# Patient Record
Sex: FEMALE | Race: WHITE | NOT HISPANIC OR LATINO | Employment: OTHER | URBAN - METROPOLITAN AREA
[De-identification: names, ages, dates, MRNs, and addresses within clinical notes are randomized per-mention and may not be internally consistent; named-entity substitution may affect disease eponyms.]

---

## 2017-11-25 ENCOUNTER — HOSPITAL ENCOUNTER (INPATIENT)
Facility: HOSPITAL | Age: 62
LOS: 1 days | Discharge: HOME | DRG: 436 | End: 2017-11-27
Attending: EMERGENCY MEDICINE | Admitting: INTERNAL MEDICINE
Payer: OTHER GOVERNMENT

## 2017-11-25 DIAGNOSIS — K86.3 PANCREATIC PSEUDOCYST/CYST: Chronic | ICD-10-CM

## 2017-11-25 DIAGNOSIS — R52 INTRACTABLE PAIN: ICD-10-CM

## 2017-11-25 DIAGNOSIS — K86.2 PANCREATIC PSEUDOCYST/CYST: Chronic | ICD-10-CM

## 2017-11-25 DIAGNOSIS — C78.7 METASTATIC CANCER TO LIVER: ICD-10-CM

## 2017-11-25 DIAGNOSIS — K86.1 OTHER CHRONIC PANCREATITIS: Primary | ICD-10-CM

## 2017-11-25 DIAGNOSIS — F32.A DEPRESSION, UNSPECIFIED DEPRESSION TYPE: ICD-10-CM

## 2017-11-25 DIAGNOSIS — C25.0 MALIGNANT NEOPLASM OF HEAD OF PANCREAS: ICD-10-CM

## 2017-11-25 DIAGNOSIS — C22.0 HCC (HEPATOCELLULAR CARCINOMA): Chronic | ICD-10-CM

## 2017-11-25 LAB
ALBUMIN SERPL BCP-MCNC: 4.1 G/DL
ALP SERPL-CCNC: 113 U/L
ALT SERPL W/O P-5'-P-CCNC: 27 U/L
AMMONIA PLAS-SCNC: 32 UMOL/L
AMORPH CRY URNS QL MICRO: ABNORMAL
AMPHET+METHAMPHET UR QL: NEGATIVE
ANION GAP SERPL CALC-SCNC: 14 MMOL/L
AST SERPL-CCNC: 25 U/L
BACTERIA #/AREA URNS HPF: ABNORMAL /HPF
BARBITURATES UR QL SCN>200 NG/ML: NEGATIVE
BASOPHILS # BLD AUTO: 0.02 K/UL
BASOPHILS NFR BLD: 0.2 %
BENZODIAZ UR QL SCN>200 NG/ML: NEGATIVE
BILIRUB SERPL-MCNC: 0.4 MG/DL
BILIRUB UR QL STRIP: NEGATIVE
BUN SERPL-MCNC: 14 MG/DL
BZE UR QL SCN: NEGATIVE
CALCIUM SERPL-MCNC: 9.1 MG/DL
CANNABINOIDS UR QL SCN: NEGATIVE
CHLORIDE SERPL-SCNC: 99 MMOL/L
CLARITY UR: ABNORMAL
CO2 SERPL-SCNC: 24 MMOL/L
COLOR UR: YELLOW
CREAT SERPL-MCNC: 0.6 MG/DL
CREAT UR-MCNC: 23.6 MG/DL
DIFFERENTIAL METHOD: ABNORMAL
EOSINOPHIL # BLD AUTO: 0.1 K/UL
EOSINOPHIL NFR BLD: 1.1 %
ERYTHROCYTE [DISTWIDTH] IN BLOOD BY AUTOMATED COUNT: 13.1 %
EST. GFR  (AFRICAN AMERICAN): >60 ML/MIN/1.73 M^2
EST. GFR  (NON AFRICAN AMERICAN): >60 ML/MIN/1.73 M^2
ETHANOL SERPL-MCNC: <10 MG/DL
GLUCOSE SERPL-MCNC: 73 MG/DL
GLUCOSE UR QL STRIP: NEGATIVE
HCT VFR BLD AUTO: 45.7 %
HGB BLD-MCNC: 16.3 G/DL
HGB UR QL STRIP: ABNORMAL
KETONES UR QL STRIP: ABNORMAL
LEUKOCYTE ESTERASE UR QL STRIP: NEGATIVE
LIPASE SERPL-CCNC: 57 U/L
LYMPHOCYTES # BLD AUTO: 1.8 K/UL
LYMPHOCYTES NFR BLD: 20.5 %
MCH RBC QN AUTO: 30.4 PG
MCHC RBC AUTO-ENTMCNC: 35.7 G/DL
MCV RBC AUTO: 85 FL
METHADONE UR QL SCN>300 NG/ML: NEGATIVE
MICROSCOPIC COMMENT: ABNORMAL
MONOCYTES # BLD AUTO: 0.5 K/UL
MONOCYTES NFR BLD: 5.2 %
NEUTROPHILS # BLD AUTO: 6.5 K/UL
NEUTROPHILS NFR BLD: 72.9 %
NITRITE UR QL STRIP: NEGATIVE
OPIATES UR QL SCN: NEGATIVE
PCP UR QL SCN>25 NG/ML: NEGATIVE
PH UR STRIP: 7 [PH] (ref 5–8)
PLATELET # BLD AUTO: 260 K/UL
PMV BLD AUTO: 9.9 FL
POTASSIUM SERPL-SCNC: 2.7 MMOL/L
PROT SERPL-MCNC: 7.3 G/DL
PROT UR QL STRIP: NEGATIVE
RBC # BLD AUTO: 5.36 M/UL
RBC #/AREA URNS HPF: 40 /HPF (ref 0–4)
SODIUM SERPL-SCNC: 137 MMOL/L
SP GR UR STRIP: 1.03 (ref 1–1.03)
SQUAMOUS #/AREA URNS HPF: 1 /HPF
TOXICOLOGY INFORMATION: NORMAL
TROPONIN I SERPL DL<=0.01 NG/ML-MCNC: 0.01 NG/ML
URN SPEC COLLECT METH UR: ABNORMAL
UROBILINOGEN UR STRIP-ACNC: NEGATIVE EU/DL
WBC # BLD AUTO: 8.87 K/UL
WBC #/AREA URNS HPF: 1 /HPF (ref 0–5)

## 2017-11-25 PROCEDURE — 81000 URINALYSIS NONAUTO W/SCOPE: CPT

## 2017-11-25 PROCEDURE — 83690 ASSAY OF LIPASE: CPT

## 2017-11-25 PROCEDURE — 93005 ELECTROCARDIOGRAM TRACING: CPT

## 2017-11-25 PROCEDURE — 82140 ASSAY OF AMMONIA: CPT

## 2017-11-25 PROCEDURE — 84484 ASSAY OF TROPONIN QUANT: CPT

## 2017-11-25 PROCEDURE — 85025 COMPLETE CBC W/AUTO DIFF WBC: CPT

## 2017-11-25 PROCEDURE — 63600175 PHARM REV CODE 636 W HCPCS: Performed by: EMERGENCY MEDICINE

## 2017-11-25 PROCEDURE — C9113 INJ PANTOPRAZOLE SODIUM, VIA: HCPCS | Performed by: EMERGENCY MEDICINE

## 2017-11-25 PROCEDURE — 25500020 PHARM REV CODE 255: Performed by: EMERGENCY MEDICINE

## 2017-11-25 PROCEDURE — 80307 DRUG TEST PRSMV CHEM ANLYZR: CPT

## 2017-11-25 PROCEDURE — 96365 THER/PROPH/DIAG IV INF INIT: CPT

## 2017-11-25 PROCEDURE — 80320 DRUG SCREEN QUANTALCOHOLS: CPT

## 2017-11-25 PROCEDURE — 99285 EMERGENCY DEPT VISIT HI MDM: CPT | Mod: 25

## 2017-11-25 PROCEDURE — 25000003 PHARM REV CODE 250: Performed by: EMERGENCY MEDICINE

## 2017-11-25 PROCEDURE — 96375 TX/PRO/DX INJ NEW DRUG ADDON: CPT

## 2017-11-25 PROCEDURE — 93010 ELECTROCARDIOGRAM REPORT: CPT | Mod: ,,, | Performed by: INTERNAL MEDICINE

## 2017-11-25 PROCEDURE — 96366 THER/PROPH/DIAG IV INF ADDON: CPT

## 2017-11-25 PROCEDURE — 80053 COMPREHEN METABOLIC PANEL: CPT

## 2017-11-25 PROCEDURE — 96361 HYDRATE IV INFUSION ADD-ON: CPT

## 2017-11-25 PROCEDURE — 96376 TX/PRO/DX INJ SAME DRUG ADON: CPT

## 2017-11-25 RX ORDER — POTASSIUM CHLORIDE 20 MEQ/1
40 TABLET, EXTENDED RELEASE ORAL
Status: COMPLETED | OUTPATIENT
Start: 2017-11-25 | End: 2017-11-25

## 2017-11-25 RX ORDER — HYDROMORPHONE HYDROCHLORIDE 2 MG/ML
1 INJECTION, SOLUTION INTRAMUSCULAR; INTRAVENOUS; SUBCUTANEOUS
Status: COMPLETED | OUTPATIENT
Start: 2017-11-25 | End: 2017-11-25

## 2017-11-25 RX ORDER — PANTOPRAZOLE SODIUM 40 MG/10ML
40 INJECTION, POWDER, LYOPHILIZED, FOR SOLUTION INTRAVENOUS
Status: COMPLETED | OUTPATIENT
Start: 2017-11-25 | End: 2017-11-25

## 2017-11-25 RX ORDER — ONDANSETRON 2 MG/ML
4 INJECTION INTRAMUSCULAR; INTRAVENOUS
Status: COMPLETED | OUTPATIENT
Start: 2017-11-25 | End: 2017-11-25

## 2017-11-25 RX ORDER — LABETALOL HYDROCHLORIDE 5 MG/ML
10 INJECTION, SOLUTION INTRAVENOUS
Status: COMPLETED | OUTPATIENT
Start: 2017-11-25 | End: 2017-11-25

## 2017-11-25 RX ORDER — AMLODIPINE BESYLATE 10 MG/1
10 TABLET ORAL DAILY
COMMUNITY

## 2017-11-25 RX ORDER — SODIUM CHLORIDE AND POTASSIUM CHLORIDE 150; 900 MG/100ML; MG/100ML
1000 INJECTION, SOLUTION INTRAVENOUS
Status: COMPLETED | OUTPATIENT
Start: 2017-11-25 | End: 2017-11-25

## 2017-11-25 RX ADMIN — IOHEXOL 15 ML: 300 INJECTION, SOLUTION INTRAVENOUS at 09:11

## 2017-11-25 RX ADMIN — HYDROMORPHONE HYDROCHLORIDE 1 MG: 2 INJECTION INTRAMUSCULAR; INTRAVENOUS; SUBCUTANEOUS at 06:11

## 2017-11-25 RX ADMIN — LABETALOL HYDROCHLORIDE 10 MG: 5 INJECTION, SOLUTION INTRAVENOUS at 06:11

## 2017-11-25 RX ADMIN — PANTOPRAZOLE SODIUM 40 MG: 40 INJECTION, POWDER, FOR SOLUTION INTRAVENOUS at 07:11

## 2017-11-25 RX ADMIN — IOHEXOL 75 ML: 350 INJECTION, SOLUTION INTRAVENOUS at 09:11

## 2017-11-25 RX ADMIN — HYDROMORPHONE HYDROCHLORIDE 1 MG: 2 INJECTION INTRAMUSCULAR; INTRAVENOUS; SUBCUTANEOUS at 10:11

## 2017-11-25 RX ADMIN — ONDANSETRON 4 MG: 2 INJECTION INTRAMUSCULAR; INTRAVENOUS at 10:11

## 2017-11-25 RX ADMIN — LIDOCAINE HYDROCHLORIDE: 20 SOLUTION ORAL; TOPICAL at 07:11

## 2017-11-25 RX ADMIN — ONDANSETRON 4 MG: 2 INJECTION INTRAMUSCULAR; INTRAVENOUS at 06:11

## 2017-11-25 RX ADMIN — SODIUM CHLORIDE 1000 ML: 0.9 INJECTION, SOLUTION INTRAVENOUS at 06:11

## 2017-11-25 RX ADMIN — SODIUM CHLORIDE AND POTASSIUM CHLORIDE 1000 ML: .9; .15 SOLUTION INTRAVENOUS at 07:11

## 2017-11-25 RX ADMIN — POTASSIUM CHLORIDE 40 MEQ: 1500 TABLET, EXTENDED RELEASE ORAL at 07:11

## 2017-11-25 NOTE — ED TRIAGE NOTES
Pt here for abdominal pain x4 months; pt reports hx of chronic pancreatitis. Rates pain 10/10. Nausea, denies vomiting. Pt is alert, REU, in no acute distress.

## 2017-11-26 PROBLEM — I10 ESSENTIAL HYPERTENSION: Status: ACTIVE | Noted: 2017-11-26

## 2017-11-26 PROBLEM — R74.01 TRANSAMINITIS: Status: ACTIVE | Noted: 2017-11-26

## 2017-11-26 PROBLEM — K86.1 OTHER CHRONIC PANCREATITIS: Status: ACTIVE | Noted: 2017-11-26

## 2017-11-26 PROBLEM — R52 PAIN: Status: ACTIVE | Noted: 2017-11-26

## 2017-11-26 PROBLEM — C25.9 PANCREATIC CANCER: Status: ACTIVE | Noted: 2017-11-26

## 2017-11-26 PROBLEM — E11.9 TYPE 2 DIABETES MELLITUS: Status: ACTIVE | Noted: 2017-11-26

## 2017-11-26 PROBLEM — F32.A DEPRESSION: Status: ACTIVE | Noted: 2017-11-26

## 2017-11-26 PROBLEM — E87.6 HYPOKALEMIA: Status: ACTIVE | Noted: 2017-11-26

## 2017-11-26 LAB
ALBUMIN SERPL BCP-MCNC: 3.1 G/DL
ALP SERPL-CCNC: 121 U/L
ALT SERPL W/O P-5'-P-CCNC: 211 U/L
ANION GAP SERPL CALC-SCNC: 6 MMOL/L
AST SERPL-CCNC: 239 U/L
BASOPHILS # BLD AUTO: 0.04 K/UL
BASOPHILS NFR BLD: 0.4 %
BILIRUB SERPL-MCNC: 0.4 MG/DL
BUN SERPL-MCNC: 12 MG/DL
CALCIUM SERPL-MCNC: 8.2 MG/DL
CHLORIDE SERPL-SCNC: 109 MMOL/L
CO2 SERPL-SCNC: 25 MMOL/L
CREAT SERPL-MCNC: 0.7 MG/DL
DIFFERENTIAL METHOD: ABNORMAL
EOSINOPHIL # BLD AUTO: 0.1 K/UL
EOSINOPHIL NFR BLD: 1.3 %
ERYTHROCYTE [DISTWIDTH] IN BLOOD BY AUTOMATED COUNT: 13.4 %
EST. GFR  (AFRICAN AMERICAN): >60 ML/MIN/1.73 M^2
EST. GFR  (NON AFRICAN AMERICAN): >60 ML/MIN/1.73 M^2
GLUCOSE SERPL-MCNC: 122 MG/DL
HCT VFR BLD AUTO: 37.5 %
HGB BLD-MCNC: 12.7 G/DL
LYMPHOCYTES # BLD AUTO: 2.4 K/UL
LYMPHOCYTES NFR BLD: 21.4 %
MCH RBC QN AUTO: 29.5 PG
MCHC RBC AUTO-ENTMCNC: 33.9 G/DL
MCV RBC AUTO: 87 FL
MONOCYTES # BLD AUTO: 0.7 K/UL
MONOCYTES NFR BLD: 6.7 %
NEUTROPHILS # BLD AUTO: 7.8 K/UL
NEUTROPHILS NFR BLD: 70.2 %
PLATELET # BLD AUTO: 226 K/UL
PMV BLD AUTO: 10.3 FL
POCT GLUCOSE: 146 MG/DL (ref 70–110)
POTASSIUM SERPL-SCNC: 3.4 MMOL/L
PROT SERPL-MCNC: 5.5 G/DL
RBC # BLD AUTO: 4.3 M/UL
SODIUM SERPL-SCNC: 140 MMOL/L
WBC # BLD AUTO: 11.08 K/UL

## 2017-11-26 PROCEDURE — 11000001 HC ACUTE MED/SURG PRIVATE ROOM

## 2017-11-26 PROCEDURE — 25000003 PHARM REV CODE 250: Performed by: EMERGENCY MEDICINE

## 2017-11-26 PROCEDURE — 83036 HEMOGLOBIN GLYCOSYLATED A1C: CPT

## 2017-11-26 PROCEDURE — 63600175 PHARM REV CODE 636 W HCPCS: Performed by: EMERGENCY MEDICINE

## 2017-11-26 PROCEDURE — 36415 COLL VENOUS BLD VENIPUNCTURE: CPT

## 2017-11-26 PROCEDURE — 85025 COMPLETE CBC W/AUTO DIFF WBC: CPT

## 2017-11-26 PROCEDURE — 63600175 PHARM REV CODE 636 W HCPCS: Performed by: INTERNAL MEDICINE

## 2017-11-26 PROCEDURE — 25000003 PHARM REV CODE 250: Performed by: INTERNAL MEDICINE

## 2017-11-26 PROCEDURE — 80053 COMPREHEN METABOLIC PANEL: CPT

## 2017-11-26 RX ORDER — POTASSIUM CHLORIDE 20 MEQ/1
20 TABLET, EXTENDED RELEASE ORAL 2 TIMES DAILY
Status: DISCONTINUED | OUTPATIENT
Start: 2017-11-26 | End: 2017-11-26

## 2017-11-26 RX ORDER — AMLODIPINE BESYLATE 5 MG/1
10 TABLET ORAL DAILY
Status: DISCONTINUED | OUTPATIENT
Start: 2017-11-26 | End: 2017-11-27 | Stop reason: HOSPADM

## 2017-11-26 RX ORDER — HYDROMORPHONE HYDROCHLORIDE 2 MG/ML
1 INJECTION, SOLUTION INTRAMUSCULAR; INTRAVENOUS; SUBCUTANEOUS EVERY 4 HOURS PRN
Status: DISCONTINUED | OUTPATIENT
Start: 2017-11-26 | End: 2017-11-26

## 2017-11-26 RX ORDER — HYDROMORPHONE HYDROCHLORIDE 2 MG/ML
1 INJECTION, SOLUTION INTRAMUSCULAR; INTRAVENOUS; SUBCUTANEOUS
Status: DISCONTINUED | OUTPATIENT
Start: 2017-11-26 | End: 2017-11-27

## 2017-11-26 RX ORDER — IBUPROFEN 600 MG/1
600 TABLET ORAL EVERY 6 HOURS PRN
Status: DISCONTINUED | OUTPATIENT
Start: 2017-11-26 | End: 2017-11-27 | Stop reason: HOSPADM

## 2017-11-26 RX ORDER — HYDROMORPHONE HYDROCHLORIDE 2 MG/ML
1 INJECTION, SOLUTION INTRAMUSCULAR; INTRAVENOUS; SUBCUTANEOUS
Status: DISCONTINUED | OUTPATIENT
Start: 2017-11-26 | End: 2017-11-26

## 2017-11-26 RX ORDER — OXYCODONE HYDROCHLORIDE 5 MG/1
5 TABLET ORAL EVERY 6 HOURS PRN
Status: DISCONTINUED | OUTPATIENT
Start: 2017-11-26 | End: 2017-11-27 | Stop reason: HOSPADM

## 2017-11-26 RX ORDER — HYDROMORPHONE HYDROCHLORIDE 2 MG/ML
0.5 INJECTION, SOLUTION INTRAMUSCULAR; INTRAVENOUS; SUBCUTANEOUS EVERY 4 HOURS PRN
Status: DISCONTINUED | OUTPATIENT
Start: 2017-11-26 | End: 2017-11-26

## 2017-11-26 RX ORDER — IBUPROFEN 600 MG/1
600 TABLET ORAL EVERY 6 HOURS PRN
Status: DISCONTINUED | OUTPATIENT
Start: 2017-11-26 | End: 2017-11-26

## 2017-11-26 RX ORDER — DEXTROSE MONOHYDRATE, SODIUM CHLORIDE, AND POTASSIUM CHLORIDE 50; 1.49; 4.5 G/1000ML; G/1000ML; G/1000ML
INJECTION, SOLUTION INTRAVENOUS CONTINUOUS
Status: DISCONTINUED | OUTPATIENT
Start: 2017-11-26 | End: 2017-11-27 | Stop reason: HOSPADM

## 2017-11-26 RX ORDER — IBUPROFEN 200 MG
16 TABLET ORAL
Status: DISCONTINUED | OUTPATIENT
Start: 2017-11-26 | End: 2017-11-27 | Stop reason: HOSPADM

## 2017-11-26 RX ORDER — GLUCAGON 1 MG
1 KIT INJECTION
Status: DISCONTINUED | OUTPATIENT
Start: 2017-11-26 | End: 2017-11-27 | Stop reason: HOSPADM

## 2017-11-26 RX ORDER — INSULIN ASPART 100 [IU]/ML
0-5 INJECTION, SOLUTION INTRAVENOUS; SUBCUTANEOUS
Status: DISCONTINUED | OUTPATIENT
Start: 2017-11-26 | End: 2017-11-27 | Stop reason: HOSPADM

## 2017-11-26 RX ORDER — LISINOPRIL 20 MG/1
20 TABLET ORAL DAILY
Status: DISCONTINUED | OUTPATIENT
Start: 2017-11-26 | End: 2017-11-27 | Stop reason: HOSPADM

## 2017-11-26 RX ORDER — AMOXICILLIN 250 MG
1 CAPSULE ORAL 2 TIMES DAILY
Status: DISCONTINUED | OUTPATIENT
Start: 2017-11-26 | End: 2017-11-27 | Stop reason: HOSPADM

## 2017-11-26 RX ORDER — ONDANSETRON 2 MG/ML
4 INJECTION INTRAMUSCULAR; INTRAVENOUS EVERY 8 HOURS PRN
Status: DISCONTINUED | OUTPATIENT
Start: 2017-11-26 | End: 2017-11-27 | Stop reason: HOSPADM

## 2017-11-26 RX ORDER — ACETAMINOPHEN 325 MG/1
650 TABLET ORAL EVERY 8 HOURS PRN
Status: DISCONTINUED | OUTPATIENT
Start: 2017-11-26 | End: 2017-11-27 | Stop reason: HOSPADM

## 2017-11-26 RX ORDER — FAMOTIDINE 20 MG/1
20 TABLET, FILM COATED ORAL 2 TIMES DAILY
Status: DISCONTINUED | OUTPATIENT
Start: 2017-11-26 | End: 2017-11-27 | Stop reason: HOSPADM

## 2017-11-26 RX ORDER — IBUPROFEN 200 MG
24 TABLET ORAL
Status: DISCONTINUED | OUTPATIENT
Start: 2017-11-26 | End: 2017-11-27 | Stop reason: HOSPADM

## 2017-11-26 RX ORDER — HYDROMORPHONE HYDROCHLORIDE 2 MG/ML
1 INJECTION, SOLUTION INTRAMUSCULAR; INTRAVENOUS; SUBCUTANEOUS
Status: COMPLETED | OUTPATIENT
Start: 2017-11-26 | End: 2017-11-26

## 2017-11-26 RX ADMIN — OXYCODONE HYDROCHLORIDE 5 MG: 5 TABLET ORAL at 09:11

## 2017-11-26 RX ADMIN — HYDROMORPHONE HYDROCHLORIDE 1 MG: 2 INJECTION, SOLUTION INTRAMUSCULAR; INTRAVENOUS; SUBCUTANEOUS at 05:11

## 2017-11-26 RX ADMIN — DOCUSATE SODIUM AND SENNOSIDES 1 TABLET: 8.6; 5 TABLET, FILM COATED ORAL at 09:11

## 2017-11-26 RX ADMIN — HYDROMORPHONE HYDROCHLORIDE 1 MG: 2 INJECTION INTRAMUSCULAR; INTRAVENOUS; SUBCUTANEOUS at 12:11

## 2017-11-26 RX ADMIN — HYDROMORPHONE HYDROCHLORIDE 1 MG: 2 INJECTION, SOLUTION INTRAMUSCULAR; INTRAVENOUS; SUBCUTANEOUS at 02:11

## 2017-11-26 RX ADMIN — DEXTROSE MONOHYDRATE, SODIUM CHLORIDE, AND POTASSIUM CHLORIDE: 50; 4.5; 1.49 INJECTION, SOLUTION INTRAVENOUS at 04:11

## 2017-11-26 RX ADMIN — FAMOTIDINE 20 MG: 20 TABLET, FILM COATED ORAL at 09:11

## 2017-11-26 RX ADMIN — DEXTROSE MONOHYDRATE, SODIUM CHLORIDE, AND POTASSIUM CHLORIDE: 50; 4.5; 1.49 INJECTION, SOLUTION INTRAVENOUS at 11:11

## 2017-11-26 RX ADMIN — HYDROMORPHONE HYDROCHLORIDE 1 MG: 2 INJECTION, SOLUTION INTRAMUSCULAR; INTRAVENOUS; SUBCUTANEOUS at 04:11

## 2017-11-26 RX ADMIN — AMLODIPINE BESYLATE 10 MG: 5 TABLET ORAL at 09:11

## 2017-11-26 RX ADMIN — LISINOPRIL 20 MG: 20 TABLET ORAL at 09:11

## 2017-11-26 RX ADMIN — POTASSIUM CHLORIDE 20 MEQ: 1500 TABLET, EXTENDED RELEASE ORAL at 09:11

## 2017-11-26 RX ADMIN — DEXTROSE MONOHYDRATE, SODIUM CHLORIDE, AND POTASSIUM CHLORIDE: 50; 4.5; 1.49 INJECTION, SOLUTION INTRAVENOUS at 05:11

## 2017-11-26 RX ADMIN — HYDROMORPHONE HYDROCHLORIDE 1 MG: 2 INJECTION, SOLUTION INTRAMUSCULAR; INTRAVENOUS; SUBCUTANEOUS at 11:11

## 2017-11-26 NOTE — ED PROVIDER NOTES
"Encounter Date: 11/25/2017    SCRIBE #1 NOTE: I, Kaushik Floresisabel, am scribing for, and in the presence of, Alejandro Peterson MD. Other sections scribed: HPI, ROS.       History     Chief Complaint   Patient presents with    Pancreatitis     pt states she has chronic pancreatitis and shes in pain     CC: Pancreatitis  HPI: This 62 y.o. female smoker with Hx of HTN, DM II, cholecystectomy, alcohol abuse, depression presents to the ED c/o severe upper abdominal pain 2nd/to "chronic pancreatitis" that she has been dealing with intermittently for the past 10 years, but has worsened and become mostly constant for the past 5 months. Pt reports associated diarrhea. She states that [facility] but adds that they are still waiting on a stool sample from her before they begin placing her on new medications to treat her pancreatitis. Pt states that she no longer drinks alcohol. Pt denies fever, cough, chest pain, SOB, dysuria, increased urinary frequency.            Review of patient's allergies indicates:  No Known Allergies  Past Medical History:   Diagnosis Date    Alcohol abuse     Depression     Diabetes mellitus     Hypertension      Past Surgical History:   Procedure Laterality Date    CHOLECYSTECTOMY      FRACTURE SURGERY      to bilat lower arms.      History reviewed. No pertinent family history.  Social History   Substance Use Topics    Smoking status: Current Every Day Smoker    Smokeless tobacco: Never Used    Alcohol use No      Comment: pt quit 1 yr ago. LL 11/25/17     Review of Systems   Constitutional: Negative for chills and fever.   HENT: Negative for ear pain, rhinorrhea and sore throat.    Eyes: Negative for pain and visual disturbance.   Respiratory: Negative for cough and shortness of breath.    Cardiovascular: Negative for chest pain.   Gastrointestinal: Positive for abdominal pain and diarrhea. Negative for vomiting.   Genitourinary: Negative for " dysuria and flank pain.   Musculoskeletal: Negative for myalgias.   Skin: Negative for rash and wound.   Neurological: Negative for syncope and headaches.       Physical Exam     Initial Vitals [11/25/17 1754]   BP Pulse Resp Temp SpO2   (!) 182/100 83 16 98.7 °F (37.1 °C) 99 %      MAP       127.33         Physical Exam    Nursing note and vitals reviewed.  Constitutional: She appears well-developed. She is not diaphoretic. No distress.   Thin, chronically ill appearing   HENT:   Head: Normocephalic and atraumatic.   Nose: Nose normal.   Eyes: EOM are normal. Pupils are equal, round, and reactive to light. No scleral icterus.   Neck: Normal range of motion. Neck supple.   Cardiovascular: Normal rate, regular rhythm, normal heart sounds and intact distal pulses. Exam reveals no gallop and no friction rub.    No murmur heard.  Pulmonary/Chest: Breath sounds normal. No stridor. No respiratory distress. She has no wheezes. She has no rhonchi. She has no rales.   Abdominal: Soft. Normal appearance and bowel sounds are normal. She exhibits no distension. There is tenderness. There is no rebound and no guarding.   Mild guarding, improved with distraction   Musculoskeletal: Normal range of motion. She exhibits no edema or tenderness.   Neurological: She is oriented to person, place, and time. No cranial nerve deficit.   Skin: Skin is warm and dry. No rash noted.   Psychiatric: She has a normal mood and affect. Her behavior is normal.         ED Course   Procedures  Labs Reviewed   CBC W/ AUTO DIFFERENTIAL - Abnormal; Notable for the following:        Result Value    Hemoglobin 16.3 (*)     All other components within normal limits   COMPREHENSIVE METABOLIC PANEL - Abnormal; Notable for the following:     Potassium 2.7 (*)     All other components within normal limits    Narrative:     POTASSIUM critical result(s) called and verbal readback obtained from   DR. ONTIVEROS., 11/25/2017 19:06   URINALYSIS - Abnormal; Notable  for the following:     Appearance, UA Hazy (*)     Ketones, UA Trace (*)     Occult Blood UA 2+ (*)     All other components within normal limits   URINALYSIS MICROSCOPIC - Abnormal; Notable for the following:     RBC, UA 40 (*)     All other components within normal limits   LIPASE   TROPONIN I   DRUG SCREEN PANEL, URINE EMERGENCY   ALCOHOL,MEDICAL (ETHANOL)   AMMONIA             Medical Decision Making:   Initial Assessment:   62-year-old female presenting with abdominal pain consistent with her prior pancreatitis.  She states she is unable to eat or drink anything In addition.  Labs are relatively unremarkable except for hypokalemia which we have repleted.  She required multiple doses of IV Dilaudid to help control her pain.  Her CT scan reveals likely metastatic pancreatic cancer.  I discussed this with her in depth.  Apparently, [facility]  She states she no longer to live with this pain and knows that her pancreas is going to kill her and believes this soon.  She states that she wishes to pass away quickly without bothering anyone and desires hospice.  She appears significantly depressed and states that she has no family and no one to take care of her or help her.  She states she is unable to perform any activities of daily living due to the pain and persistent weakness that she has.  I am not under the impression that she is acutely suicidal, but it does seem to me that she is contemplating death and desires for to come soon.  Given the intractable pain with chronic pancreatitis, hypokalemia, and depressive thoughts, she is being admitted for further evaluation and consult by the mom, and psychiatry, potassium repletion, and pain control.              Scribe Attestation:   Scribe #1: I performed the above scribed service and the documentation accurately describes the services I performed. I attest to the accuracy of the note.    Attending Attestation:            Physician Attestation for Scribe:  Physician Attestation Statement for Scribe #1: I, Alejandro Peterson MD, reviewed documentation, as scribed by Kaushik Mejia in my presence, and it is both accurate and complete.                 ED Course      Clinical Impression:   The primary encounter diagnosis was Other chronic pancreatitis. Diagnoses of Malignant neoplasm of head of pancreas, Metastatic cancer to liver, Depression, unspecified depression type, and Intractable pain were also pertinent to this visit.                           David Islas MD  11/26/17 0122       David Islas MD  12/22/17 1037

## 2017-11-26 NOTE — H&P
[facility] Hospital Medicine  History & Physical    Patient Name: Shanon ANDREWS  MRN: [id number]  Admission Date: 11/25/2017  Attending Physician: Jessie Gordillo MD  Primary Care Provider: Primary Doctor No         Patient information was obtained from patient, past medical records and ER records.     Subjective:     Principal Problem:Pancreatic cancer    Chief Complaint:   Chief Complaint   Patient presents with    Pancreatitis     pt states she has chronic pancreatitis and shes in pain        HPI: Ms. Andrews ([id number]) is a 62 y.o. woman with tobacco abuse, HTN, DM II (unk HgA1c), chronic pancreatitis, cholecystectomy, alcohol abuse, and depression with h/o SI who presented with acute onset of severe abdominal pain. She has suffered from abdominal pain for many months with [facility]. She activated EMS on 11/25 because she couldn't sleep the pain had become so severe. The pain is associated with nausea and decreased PO intake.   On CT ab/pel she was found to have a pancreatic lesion and multifocal hypoattenuating lesions of the liver concerning for metastatic pancreatic cancer. The lesion of her pancreas is apparently not new and she [facility] to [facility] these [facility].  She tells me and the ER doctor that she is depressed and wants to go to hospice because she doesn't want to suffer any longer. She just wants the pain to stop. She currently denies SI but in the past contemplated suicide by taking all of her potassium pills at once. Psychiatry and hem/onc have been consulted. [facility] the [facility] have [facility].    Past Medical History:   Diagnosis Date    Alcohol abuse     Depression     Diabetes mellitus     Hypertension        Past Surgical History:   Procedure Laterality Date    CHOLECYSTECTOMY      FRACTURE SURGERY      to bilat lower arms.        Review of patient's allergies  indicates:  No Known Allergies    No current facility-administered medications on file prior to encounter.      Current Outpatient Prescriptions on File Prior to Encounter   Medication Sig    lisinopril (PRINIVIL,ZESTRIL) 20 MG tablet Take 20 mg by mouth once daily.    omeprazole (PRILOSEC) 20 MG capsule Take 20 mg by mouth once daily.    metformin (FORTAMET) 500 mg 24 hr tablet Take 500 mg by mouth daily with breakfast.    potassium chloride SA (K-DUR,KLOR-CON) 20 MEQ tablet Take 1 tablet (20 mEq total) by mouth 2 (two) times daily.     Family History     None        Social History Main Topics    Smoking status: Current Every Day Smoker     Packs/day: 2.00    Smokeless tobacco: Never Used    Alcohol use No      Comment: pt quit 1 yr ago. LL 11/25/17    Drug use: No    Sexual activity: Not on file     Review of Systems   Constitutional: Positive for activity change and fatigue. Negative for chills and fever.   HENT: Negative for congestion and rhinorrhea.    Eyes: Negative for photophobia and visual disturbance.   Respiratory: Negative for cough and shortness of breath.    Cardiovascular: Negative for chest pain and leg swelling.   Gastrointestinal: Positive for abdominal pain and nausea. Negative for vomiting.   Genitourinary: Negative for difficulty urinating and dysuria.   Musculoskeletal: Negative for joint swelling and neck stiffness.   Skin: Negative for rash and wound.   Neurological: Negative for dizziness and light-headedness.   Psychiatric/Behavioral: Positive for dysphoric mood and sleep disturbance.     Objective:     Vital Signs (Most Recent):  Temp: 97.5 °F (36.4 °C) (11/26/17 1142)  Pulse: 71 (11/26/17 1142)  Resp: 18 (11/26/17 1142)  BP: (!) 179/80 (11/26/17 1142)  SpO2: 95 % (11/26/17 1142) Vital Signs (24h Range):  Temp:  [97.5 °F (36.4 °C)-98.7 °F (37.1 °C)] 97.5 °F (36.4 °C)  Pulse:  [60-83] 71  Resp:  [16-18] 18  SpO2:  [88 %-99 %] 95 %  BP: (133-182)/() 179/80     Weight: 47.2 kg  (104 lb)  Body mass index is 19.02 kg/m².    Physical Exam   Constitutional: She is oriented to person, place, and time. She appears well-developed and well-nourished. No distress.   Ambulating independently in her room   HENT:   Right Ear: External ear normal.   Left Ear: External ear normal.   Nose: Nose normal.   Mouth/Throat: Oropharynx is clear and moist.   Eyes: EOM are normal. Pupils are equal, round, and reactive to light. No scleral icterus.   Neck: Normal range of motion. Neck supple. No thyromegaly present.   Cardiovascular: Normal rate and normal heart sounds.  Exam reveals no friction rub.    No murmur heard.  Pulmonary/Chest: Effort normal and breath sounds normal. No respiratory distress. She has no wheezes. She has no rales.   Abdominal: Soft. Bowel sounds are normal. She exhibits no distension and no mass. There is tenderness.   Musculoskeletal: Normal range of motion. She exhibits no edema or deformity.   Neurological: She is alert and oriented to person, place, and time. No cranial nerve deficit.   Skin: Skin is warm and dry. No pallor.   Psychiatric:   Tearful        Significant Labs: All pertinent labs within the past 24 hours have been reviewed.    Significant Imaging: I have reviewed and interpreted all pertinent imaging results/findings within the past 24 hours.    Assessment/Plan:     * Pancreatic cancer    CT ab/pelvis concerning for pancreatic cancer. [facility] pending. Pain control.         Depression    Concern for depression. She denies SI but has had SI in the past. Psychiatry consulted.        Other chronic pancreatitis    Supportive treatment with IV fluids, pain meds, and antiemetics.        Type 2 diabetes mellitus    On metformin at home. SSI with meals. A1c.        Essential hypertension    Continue lisinopril, amlodipine, and pain control and monitor.        Pain    2/2 malignancy vs chronic pancreatitis?        Transaminitis    IV fluids.  Monitor.        Hypokalemia    Replete and monitor          VTE Risk Mitigation         Ordered     Medium Risk of VTE  Once      11/26/17 0245     Place YANET hose  Until discontinued      11/26/17 0245     Place sequential compression device  Until discontinued      11/26/17 0245         **Addendum:  Ms. Andrews is [facility]. The liver lesions and pancreatic lesions are not new. She has HCV, an AFP of 7.46, and hepatology has deemed her intermediate probability for having HCC. She has known dilatation of the biliary system on previous imaging. 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 She had a Cscope in October with 7 hyperplastic polyps and an EGD with gastritis and duodenal polyps. Her case was discussed with tumor board on November 3rd. She has follow up with GI on December 10th, 2017.       Jessie Gordillo MD  Department of Hospital Medicine   [facility]

## 2017-11-26 NOTE — PLAN OF CARE
Problem: Patient Care Overview  Goal: Plan of Care Review  Outcome: Ongoing (interventions implemented as appropriate)  Plan of care established. IV fluids administered as ordered. Complaint of severe abdominal pain moderately relieved with PRN medication. Bed alarm armed and audible for safety. Remains free from falls or trauma.

## 2017-11-26 NOTE — SUBJECTIVE & OBJECTIVE
Past Medical History:   Diagnosis Date    Alcohol abuse     Depression     Diabetes mellitus     Hypertension        Past Surgical History:   Procedure Laterality Date    CHOLECYSTECTOMY      FRACTURE SURGERY      to bilat lower arms.        Review of patient's allergies indicates:  No Known Allergies    No current facility-administered medications on file prior to encounter.      Current Outpatient Prescriptions on File Prior to Encounter   Medication Sig    lisinopril (PRINIVIL,ZESTRIL) 20 MG tablet Take 20 mg by mouth once daily.    omeprazole (PRILOSEC) 20 MG capsule Take 20 mg by mouth once daily.    metformin (FORTAMET) 500 mg 24 hr tablet Take 500 mg by mouth daily with breakfast.    potassium chloride SA (K-DUR,KLOR-CON) 20 MEQ tablet Take 1 tablet (20 mEq total) by mouth 2 (two) times daily.     Family History     None        Social History Main Topics    Smoking status: Current Every Day Smoker     Packs/day: 2.00    Smokeless tobacco: Never Used    Alcohol use No      Comment: pt quit 1 yr ago. LL 11/25/17    Drug use: No    Sexual activity: Not on file     Review of Systems   Constitutional: Positive for activity change and fatigue. Negative for chills and fever.   HENT: Negative for congestion and rhinorrhea.    Eyes: Negative for photophobia and visual disturbance.   Respiratory: Negative for cough and shortness of breath.    Cardiovascular: Negative for chest pain and leg swelling.   Gastrointestinal: Positive for abdominal pain and nausea. Negative for vomiting.   Genitourinary: Negative for difficulty urinating and dysuria.   Musculoskeletal: Negative for joint swelling and neck stiffness.   Skin: Negative for rash and wound.   Neurological: Negative for dizziness and light-headedness.   Psychiatric/Behavioral: Positive for dysphoric mood and sleep disturbance.     Objective:     Vital Signs (Most Recent):  Temp: 97.5 °F (36.4 °C) (11/26/17 1142)  Pulse: 71 (11/26/17 1142)  Resp: 18  (11/26/17 1142)  BP: (!) 179/80 (11/26/17 1142)  SpO2: 95 % (11/26/17 1142) Vital Signs (24h Range):  Temp:  [97.5 °F (36.4 °C)-98.7 °F (37.1 °C)] 97.5 °F (36.4 °C)  Pulse:  [60-83] 71  Resp:  [16-18] 18  SpO2:  [88 %-99 %] 95 %  BP: (133-182)/() 179/80     Weight: 47.2 kg (104 lb)  Body mass index is 19.02 kg/m².    Physical Exam   Constitutional: She is oriented to person, place, and time. She appears well-developed and well-nourished. No distress.   Ambulating independently in her room   HENT:   Right Ear: External ear normal.   Left Ear: External ear normal.   Nose: Nose normal.   Mouth/Throat: Oropharynx is clear and moist.   Eyes: EOM are normal. Pupils are equal, round, and reactive to light. No scleral icterus.   Neck: Normal range of motion. Neck supple. No thyromegaly present.   Cardiovascular: Normal rate and normal heart sounds.  Exam reveals no friction rub.    No murmur heard.  Pulmonary/Chest: Effort normal and breath sounds normal. No respiratory distress. She has no wheezes. She has no rales.   Abdominal: Soft. Bowel sounds are normal. She exhibits no distension and no mass. There is tenderness.   Musculoskeletal: Normal range of motion. She exhibits no edema or deformity.   Neurological: She is alert and oriented to person, place, and time. No cranial nerve deficit.   Skin: Skin is warm and dry. No pallor.   Psychiatric:   Tearful        Significant Labs: All pertinent labs within the past 24 hours have been reviewed.    Significant Imaging: I have reviewed and interpreted all pertinent imaging results/findings within the past 24 hours.

## 2017-11-26 NOTE — NURSING
"Patient states she has had suicidal thoughts in the past and planned to "take all my potassium pills". Patient states she does not have suicidal thoughts at this time and that "if I wanted to hurt myself, I would not have come to the hospital". Psychiatry consulted. Dr. Gordillo aware. Will continue to monitor patient.  "

## 2017-11-26 NOTE — ASSESSMENT & PLAN NOTE
CT ab/pelvis concerning for pancreatic cancer. She [facility]. Records pending. Pain control.

## 2017-11-26 NOTE — HPI
Ms. Andrews ([id number] [facility]) is a 62 y.o. woman with tobacco abuse, HTN, DM II (HgA1c 4.8%), chronic pancreatitis, cholecystectomy, alcohol abuse, and depression with h/o SI who presented with acute onset of severe abdominal pain. She has suffered from abdominal pain for many months with [facility]. She activated EMS on 11/25 because she couldn't sleep the pain had become so severe. The pain is associated with nausea and decreased PO intake.   On CT ab/pel she was found to have a pancreatic lesion and multifocal hypoattenuating lesions of the liver concerning for metastatic pancreatic cancer. The lesion of her pancreas is apparently not new and she recently [facility] to [facility] these findings.  She tells me and the ER doctor that she is depressed and wants to go to hospice because she doesn't want to suffer any longer. She just wants the pain to stop. She currently denies SI but in the past contemplated suicide by taking all of her potassium pills at once. Psychiatry and hem/onc have been consulted. [facility] from the [facility] have been requested.

## 2017-11-26 NOTE — PLAN OF CARE
Patient appears anxious.  She states that she would like to go to hospice sionce she has chronic pancreatitis.  States is [facility] with [facility].  Only living relative is Omar Ralph in [place], [place] [phone]. Also no friends.  [facility]  Utilizes [employer] for transportation.  Patients says she get a check every month  $3, 000.00.       11/26/17 1230   Discharge Assessment   Assessment Type Discharge Planning Assessment   Confirmed/corrected address and phone number on facesheet? Yes  ([employer])   Assessment information obtained from? Patient   Communicated expected length of stay with patient/caregiver no   Prior to hospitilization cognitive status: Alert/Oriented   Prior to hospitalization functional status: Independent   Current cognitive status: Alert/Oriented   Lives With alone   Able to Return to Prior Arrangements unable to determine at this time (comments)   Is patient able to care for self after discharge? Unable to determine at this time (comments)   Who are your caregiver(s) and their phone number(s)? (nephew, Omar Ralph [phone])   Patient's perception of discharge disposition admitted as an inpatient   Readmission Within The Last 30 Days no previous admission in last 30 days   Patient currently being followed by outpatient case management? No   Patient currently receives any other outside agency services? No   Equipment Currently Used at Home none   Do you have any problems affording any of your prescribed medications? No   Does the patient have transportation home? Yes   Transportation Available other (see comments);taxi  ([employer])   Does the patient receive services at the [facility]? No   Discharge Plan A Other;Psychiatric hospital  ([facility] [place]  [facility] [address]   Discharge Plan B Shelter;Psychiatric hospital   Patient/Family In Agreement With Plan yes   Readmission Questionnaire   Have you felt down, depressed, or hopeless? 3   Have  you felt little interest or pleasure in doing things? 3    Awaiting psychiatric evaluation.  .Emilia Pimentel RN, BSN, STN [facility]  11/26/2017

## 2017-11-27 VITALS
WEIGHT: 104 LBS | HEART RATE: 85 BPM | DIASTOLIC BLOOD PRESSURE: 82 MMHG | TEMPERATURE: 98 F | HEIGHT: 62 IN | OXYGEN SATURATION: 97 % | RESPIRATION RATE: 20 BRPM | SYSTOLIC BLOOD PRESSURE: 169 MMHG | BODY MASS INDEX: 19.14 KG/M2

## 2017-11-27 PROBLEM — C22.0 HCC (HEPATOCELLULAR CARCINOMA): Chronic | Status: ACTIVE | Noted: 2017-11-27

## 2017-11-27 PROBLEM — K86.2 PANCREATIC PSEUDOCYST/CYST: Status: ACTIVE | Noted: 2017-11-27

## 2017-11-27 PROBLEM — K86.3 PANCREATIC PSEUDOCYST/CYST: Status: ACTIVE | Noted: 2017-11-27

## 2017-11-27 PROBLEM — F33.1 MAJOR DEPRESSIVE DISORDER, RECURRENT, MODERATE: Status: ACTIVE | Noted: 2017-11-27

## 2017-11-27 PROBLEM — R74.01 TRANSAMINITIS: Status: RESOLVED | Noted: 2017-11-26 | Resolved: 2017-11-27

## 2017-11-27 PROBLEM — E87.6 HYPOKALEMIA: Status: RESOLVED | Noted: 2017-11-26 | Resolved: 2017-11-27

## 2017-11-27 PROBLEM — R52 PAIN: Chronic | Status: ACTIVE | Noted: 2017-11-26

## 2017-11-27 PROBLEM — K86.1 OTHER CHRONIC PANCREATITIS: Chronic | Status: ACTIVE | Noted: 2017-11-26

## 2017-11-27 PROBLEM — E11.9 TYPE 2 DIABETES MELLITUS: Chronic | Status: ACTIVE | Noted: 2017-11-26

## 2017-11-27 PROBLEM — F43.23 ADJUSTMENT DISORDER WITH MIXED ANXIETY AND DEPRESSED MOOD: Status: ACTIVE | Noted: 2017-11-27

## 2017-11-27 PROBLEM — F32.A DEPRESSION: Chronic | Status: ACTIVE | Noted: 2017-11-26

## 2017-11-27 PROBLEM — K86.3 PANCREATIC PSEUDOCYST/CYST: Chronic | Status: ACTIVE | Noted: 2017-11-27

## 2017-11-27 PROBLEM — K86.2 PANCREATIC PSEUDOCYST/CYST: Chronic | Status: ACTIVE | Noted: 2017-11-27

## 2017-11-27 PROBLEM — I10 ESSENTIAL HYPERTENSION: Chronic | Status: ACTIVE | Noted: 2017-11-26

## 2017-11-27 LAB
ALBUMIN SERPL BCP-MCNC: 3.3 G/DL
ALP SERPL-CCNC: 132 U/L
ALT SERPL W/O P-5'-P-CCNC: 139 U/L
ANION GAP SERPL CALC-SCNC: 7 MMOL/L
AST SERPL-CCNC: 53 U/L
BASOPHILS # BLD AUTO: 0.04 K/UL
BASOPHILS NFR BLD: 0.4 %
BILIRUB SERPL-MCNC: 0.4 MG/DL
BUN SERPL-MCNC: 6 MG/DL
CALCIUM SERPL-MCNC: 8.7 MG/DL
CHLORIDE SERPL-SCNC: 109 MMOL/L
CO2 SERPL-SCNC: 23 MMOL/L
CREAT SERPL-MCNC: 0.6 MG/DL
DIFFERENTIAL METHOD: ABNORMAL
EOSINOPHIL # BLD AUTO: 0.7 K/UL
EOSINOPHIL NFR BLD: 7.8 %
ERYTHROCYTE [DISTWIDTH] IN BLOOD BY AUTOMATED COUNT: 13.6 %
EST. GFR  (AFRICAN AMERICAN): >60 ML/MIN/1.73 M^2
EST. GFR  (NON AFRICAN AMERICAN): >60 ML/MIN/1.73 M^2
ESTIMATED AVG GLUCOSE: 91 MG/DL
GLUCOSE SERPL-MCNC: 186 MG/DL
HBA1C MFR BLD HPLC: 4.8 %
HCT VFR BLD AUTO: 42.7 %
HGB BLD-MCNC: 14.2 G/DL
LYMPHOCYTES # BLD AUTO: 1.6 K/UL
LYMPHOCYTES NFR BLD: 17.7 %
MCH RBC QN AUTO: 29.8 PG
MCHC RBC AUTO-ENTMCNC: 33.3 G/DL
MCV RBC AUTO: 90 FL
MONOCYTES # BLD AUTO: 0.4 K/UL
MONOCYTES NFR BLD: 4.5 %
NEUTROPHILS # BLD AUTO: 6.3 K/UL
NEUTROPHILS NFR BLD: 69.5 %
PLATELET # BLD AUTO: 247 K/UL
PMV BLD AUTO: 9.8 FL
POCT GLUCOSE: 187 MG/DL (ref 70–110)
POTASSIUM SERPL-SCNC: 3.4 MMOL/L
PROT SERPL-MCNC: 6.1 G/DL
RBC # BLD AUTO: 4.77 M/UL
SODIUM SERPL-SCNC: 139 MMOL/L
WBC # BLD AUTO: 9.13 K/UL

## 2017-11-27 PROCEDURE — 90792 PSYCH DIAG EVAL W/MED SRVCS: CPT | Mod: ,,, | Performed by: PSYCHIATRY & NEUROLOGY

## 2017-11-27 PROCEDURE — 25000003 PHARM REV CODE 250: Performed by: EMERGENCY MEDICINE

## 2017-11-27 PROCEDURE — 63600175 PHARM REV CODE 636 W HCPCS: Performed by: INTERNAL MEDICINE

## 2017-11-27 PROCEDURE — 63600175 PHARM REV CODE 636 W HCPCS: Performed by: HOSPITALIST

## 2017-11-27 PROCEDURE — 25000003 PHARM REV CODE 250: Performed by: INTERNAL MEDICINE

## 2017-11-27 PROCEDURE — 85025 COMPLETE CBC W/AUTO DIFF WBC: CPT

## 2017-11-27 PROCEDURE — 36415 COLL VENOUS BLD VENIPUNCTURE: CPT

## 2017-11-27 PROCEDURE — 80053 COMPREHEN METABOLIC PANEL: CPT

## 2017-11-27 RX ORDER — TRAMADOL HYDROCHLORIDE 50 MG/1
100 TABLET ORAL EVERY 6 HOURS PRN
Qty: 40 TABLET | Refills: 0 | Status: SHIPPED | OUTPATIENT
Start: 2017-11-27 | End: 2017-12-02

## 2017-11-27 RX ORDER — HYDRALAZINE HYDROCHLORIDE 20 MG/ML
10 INJECTION INTRAMUSCULAR; INTRAVENOUS EVERY 6 HOURS PRN
Status: DISCONTINUED | OUTPATIENT
Start: 2017-11-27 | End: 2017-11-27 | Stop reason: HOSPADM

## 2017-11-27 RX ADMIN — OXYCODONE HYDROCHLORIDE 5 MG: 5 TABLET ORAL at 04:11

## 2017-11-27 RX ADMIN — LISINOPRIL 20 MG: 20 TABLET ORAL at 08:11

## 2017-11-27 RX ADMIN — FAMOTIDINE 20 MG: 20 TABLET, FILM COATED ORAL at 08:11

## 2017-11-27 RX ADMIN — OXYCODONE HYDROCHLORIDE 5 MG: 5 TABLET ORAL at 10:11

## 2017-11-27 RX ADMIN — HYDROMORPHONE HYDROCHLORIDE 1 MG: 2 INJECTION, SOLUTION INTRAMUSCULAR; INTRAVENOUS; SUBCUTANEOUS at 11:11

## 2017-11-27 RX ADMIN — HYDRALAZINE HYDROCHLORIDE 10 MG: 20 INJECTION INTRAMUSCULAR; INTRAVENOUS at 04:11

## 2017-11-27 RX ADMIN — HYDROMORPHONE HYDROCHLORIDE 1 MG: 2 INJECTION, SOLUTION INTRAMUSCULAR; INTRAVENOUS; SUBCUTANEOUS at 05:11

## 2017-11-27 RX ADMIN — DEXTROSE MONOHYDRATE, SODIUM CHLORIDE, AND POTASSIUM CHLORIDE: 50; 4.5; 1.49 INJECTION, SOLUTION INTRAVENOUS at 08:11

## 2017-11-27 RX ADMIN — DOCUSATE SODIUM AND SENNOSIDES 1 TABLET: 8.6; 5 TABLET, FILM COATED ORAL at 08:11

## 2017-11-27 RX ADMIN — AMLODIPINE BESYLATE 10 MG: 5 TABLET ORAL at 08:11

## 2017-11-27 NOTE — NURSING
Pt's discharge instructions, prescriptions and follow up appts given. Verbalized understanding.  IV discontinued. Belongings at bedside. Pt off unit via wheelchair to wait for cab

## 2017-11-27 NOTE — NURSING
Patient signed consent for release of medical records from [facility]. Consent faxed to [phone]. Will continue to monitor patient.

## 2017-11-27 NOTE — PROGRESS NOTES
JONH reviewed post discharge appt with patient. SW advised patient to bring her discharge papers with her to her appointment tomorrow.    Patient's nurse advised that appt had been reviewed with patient and that patient is ready for discharge.  Martina Spencer LMSW, FITO[facility]Jonh, [facility]  11/27/2017

## 2017-11-27 NOTE — ASSESSMENT & PLAN NOTE
Patient has a lot of psychosocial factors and somewhat limited support to get help.  She is [facility] with the [facility] and would benefit from a [facility]  to assist with transportation and care.  She is offered our support to help with [facility] placement in a nursing home but again does not want to be here beyond responsibility of having to pay her rent by tomorrow.  She may be a candidate for hospice in the future but does not appear that she would meet criteria now.

## 2017-11-27 NOTE — PLAN OF CARE
Problem: Patient Care Overview  Goal: Plan of Care Review  Outcome: Ongoing (interventions implemented as appropriate)  IV fluids administered as ordered. Tolerating full liquid diet. Complaint of severe pain managed with PRN pain medication (PO and IV as ordered). Blood glucose not requiring intervention. PRN hydralazine added for elevated blood pressure. Ambulates independently to restroom. Remains free from falls or trauma.

## 2017-11-27 NOTE — PROGRESS NOTES
[facility]ONH contacted the [facility] and obtained a post discharge appt for patient to be seen tomorrow at 2:00 p.m.  Advised pt to bring all of her paperwork with her and to ask the doctor for a referral to a .  Contact numbers for [facility], [facility], and the [facility] provided on discharge instructions.   Martina Spencer LMSW, [place], [facility]  11/27/2017

## 2017-11-27 NOTE — DISCHARGE INSTRUCTIONS
For Housing Assistance:  Call [facility] at [phone] EXT  [address]  [facility] [phone] option 1  The [facility]: [phone] :  Call for information regarding transportation to your appointments.

## 2017-11-27 NOTE — PROGRESS NOTES
Written Healthcare Information:    Follow-up Information     Primary Doctor No In 2 days.    Why:  Women's clinic at the [facility]. Go on 11/28/2017.    [facility]  [facility] 28, 2017 at 2:00 p.m. for your followup visit.               Bring your discharge papers with you to your appointment.    Ask your physician for a referral to a  or counselor.    Thank you for choosing [facility] for your care.  Within 48-72 hours after leaving the hospital you will receive a call from [facility] nurses following up to see how you are doing.  The team will ask you a few questions and the call will last approximately 20 minutes.    Please answer any calls you may receive from [facility].  We want to continue to support you as you manage your healthcare needs.  [facility] is happy to have the opportunity to serve you.    Sincerely      Martina-  Your [facility]  [phone]

## 2017-11-27 NOTE — ASSESSMENT & PLAN NOTE
Long history of poorly treated depression.  Has not followed up appropriately with outpatient care.  Failed many medications in the past.  Not fully meeting criteria for acute psychiatric admission at this time and patient does not want to go voluntarily.  She does not want to go in because of her responsibility to pay rent by tomorrow.  Has future oriented plans.  Would recommend to start Lexapro 10mg daily now.  If this is tolerated, her outpatient psychiatrist should entertain augmentation with Abilify.

## 2017-11-27 NOTE — HPI
Patient Shanon CHOUDHARY presents to hospital via self. She called 911 and asked to be brought to the [facility] but was brought here instead.  Patient is alert and awake and dressed in hospital gown. Patient is not PEC'd. Patient reports that she is in the hospital due to increased abdominal pain. Reports a long history of pancreatic pain and pancreatitis. States that about ten years ago she was diagnosed with pancreatitis but got better. Started having pain about 4 months ago and the other night she was unable to tolerate the pain and came to the emergency room. Reports that she told her doctor that she has suffered with depression for a long time. States that she doesn't remember when she was first diagnosed but has seen psychiatrist and been on many medications throughout many years. Reports that she was previously living in [place] taking care of her mother but moved to [place] 2 years ago when mother  and hasn't established care with a psychiatrist. Reports that while in [place] she was on medication (Pristiq and Wellbutrin) but reports that they weren't helpful and she was still experiencing depressive symptoms. Reports that she has no social support, all her family members are  and she has no children. Patient reports that has a history of suicidal ideation, thought about taking a handful of pills a few years ago. Denies current thoughts of self harm. Patient has been hospitalized multiple times for psychiatric and substance abuse admissions. Reports that she last used alcohol 1 year ago. Denies illicit drug use. Patient is calm and cooperative during assessment.  Smokes 2 ppd.  She admits to depression, loss of interest, no motivation, no energy.  Feels like she worries about things that she should not worry about.  Denies faby or psychosis.    Many previous failed medications: Prozac, Paxil, Zoloft, Celexa, Effexor, Pristiq, Wellbutrin, TCAs (suffered anticholinergic side  effects).    [facility] paperwork per primary team shows HCC and ongoing pancreatic problems.  [facility] for [facility] and care of these problems.  [facility] in getting to [facility].  Has been walking to the [facility] across the street to get groceries and cigarettes.    She is asking to be discharged today or tomorrow so that she can pay her weekly rent at the motel so her belonging don't get thrown out on the street.

## 2017-11-27 NOTE — PLAN OF CARE
11/27/17 1549   Final Note   Assessment Type Final Discharge Note   Discharge Disposition Home   What phone number can be called within the next 1-3 days to see how you are doing after discharge? [phone]   Hospital Follow Up  Appt(s) scheduled? Yes   Discharge plans and expectations educations in teach back method with documentation complete? Yes   Right Care Referral Info   Post Acute Recommendation No Care
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
## 2017-11-28 NOTE — HOSPITAL COURSE
Per review of [facility] records, Ms. Andrews is currently undergoing [facility]up at the [facility]. The liver lesions and pancreatic lesions are not new. She has HCV, an AFP of 7.46, and hepatology has deemed her intermediate probability for having HCC. She has known dilatation of the biliary system on previous imaging. 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 She had a Cscope in October with 7 hyperplastic polyps and an EGD with gastritis and duodenal polyps. Her case was discussed with tumor board on November 3rd. She has follow up with GI on December 10th, 2017. She has chronic pancreatitis and has been prescribed tramadol for this is the past.  She was evaluated by psychiatry during admission due to concern for depression and possible SI. She was deemed to have major depressive disorder and adjustment disorder with anxiety and depression, but denied current SI and was very future oriented in her thinking and goals. She has a  through the [facility] and was advised to also get connected with as  as she currently is living in a motel, has no transportation, and was even requesting nursing home placement. She has failed multiple antidepressants in the past. Dr. Urena, psych, recommended starting Lexapro 10mg daily and possibly adding Abilify if Lexapro is tolerated. She was discharged prior to receiving her prescription for Lexapro but had an appointment scheduled with her PCP 11/28 @2pm. I [facility] Dr. Simms, with the recommendations as the numbers listed for the patient and her emergency contact were ineffective after multiple attempts.

## 2017-11-28 NOTE — DISCHARGE SUMMARY
[facility]  Discharge Summary      Patient Name: Shanon ANDREWS  MRN: [id number]  Admission Date: 11/25/2017  Hospital Length of Stay: 1 days  Discharge Date and Time: 11/27/2017  4:16 PM  Attending Physician: No att. providers found   Discharging Provider: Jessie Gordillo MD  Primary Care Provider: [facility]      HPI:   Ms. Andrews (last 4 SSN [id number] for [facility]) is a 62 y.o. woman with tobacco abuse, HTN, DM II (HgA1c 4.8%), chronic pancreatitis, cholecystectomy, alcohol abuse, and depression with h/o SI who presented with acute onset of severe abdominal pain. She has suffered from abdominal pain for many months with [facility]. She activated EMS on 11/25 because she couldn't sleep the pain had become so severe. The pain is associated with nausea and decreased PO intake.   On CT ab/pel she was found to have a pancreatic lesion and multifocal hypoattenuating lesions of the liver concerning for metastatic pancreatic cancer. The lesion of her pancreas is apparently not new and she recently [facility] to [facility] these findings.  She tells me and the ER doctor that she is depressed and wants to go to hospice because she doesn't want to suffer any longer. She just wants the pain to stop. She currently denies SI but in the past contemplated suicide by taking all of her potassium pills at once. Psychiatry and hem/onc have been consulted. [facility] the [facility] have [facility].    Hospital Course:   Per [facility], [facility]. Andrews is [facility]. The liver lesions and pancreatic lesions are not new. She has HCV, an AFP of 7.46, and hepatology has deemed her intermediate probability for having HCC. She has known dilatation of the biliary system on previous imaging. 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 She had a Cscope in October with 7 hyperplastic polyps and an EGD with gastritis and duodenal polyps. Her case was discussed with tumor board on November 3rd. She has follow up with GI on December 10th, 2017. She has chronic pancreatitis and has been prescribed tramadol for this is the past.  She was evaluated by psychiatry during admission due to concern for depression and possible SI. She was deemed to have major depressive disorder and adjustment disorder with anxiety and depression, but denied current SI and was very future oriented in her thinking and goals. She [facility] a  [facility] and was advised to also get connected with as  as she currently is living in a motel, has no transportation, and was even requesting nursing home placement. She has failed multiple antidepressants in the past. Dr. Urena, psych, recommended starting Lexapro 10mg daily and possibly adding Abilify if Lexapro is tolerated. She was discharged prior to receiving her prescription for Lexapro but had an appointment scheduled with her PCP 11/28 @2pm. I contacted [facility], Dr. Simms, with the recommendations as the numbers listed for the patient and her emergency contact were ineffective after multiple attempts.     Consults:   Consults         Status Ordering Provider     Inpatient consult to Psychiatry  Once     Provider:  Pelon Urena MD    Completed VIANCA SALGADO     Inpatient consult to Spiritual Care  Once     Provider:  (Not yet assigned)    JAVY Pineda          Final Active Diagnoses:    Diagnosis Date Noted POA    PRINCIPAL PROBLEM:  HCC (hepatocellular carcinoma) [C22.0] 11/27/2017 Yes     Chronic    Depression [F32.9] 11/26/2017 Yes     Chronic    Other chronic pancreatitis [K86.1] 11/26/2017 Yes     Chronic    Pancreatic pseudocyst/cyst [K86.2, K86.3] 11/27/2017 Yes     Chronic     Major depressive disorder, recurrent, moderate [F33.1] 11/27/2017 Yes    Adjustment disorder with mixed anxiety and depressed mood [F43.23] 11/27/2017 Yes    Pain [R52] 11/26/2017 Yes     Chronic    Essential hypertension [I10] 11/26/2017 Yes     Chronic    Type 2 diabetes mellitus [E11.9] 11/26/2017 Yes     Chronic      Problems Resolved During this Admission:    Diagnosis Date Noted Date Resolved POA    Hypokalemia [E87.6] 11/26/2017 11/27/2017 Yes    Transaminitis [R74.0] 11/26/2017 11/27/2017 Yes       Discharged Condition: stable    Disposition: Home or Self Care    Follow Up:  Follow-up Information     Primary Doctor No In 2 days.    Why:  Women's clinic at the [facility]. Go on 11/28/2017.    Why:  [facility] tomorrow , November 28, 2017 at 2:00 p.m. for your followup visit.               Patient Instructions:     Diet Diabetic 1800 Calories     Activity as tolerated     Call MD for:  increased confusion or weakness     Call MD for:  persistent dizziness, light-headedness, or visual disturbances     Call MD for:  worsening rash     Call MD for:  severe persistent headache     Call MD for:  difficulty breathing or increased cough     Call MD for:  redness, tenderness, or signs of infection (pain, swelling, redness, odor or green/yellow discharge around incision site)     Call MD for:  severe uncontrolled pain     Call MD for:  persistent nausea and vomiting or diarrhea     Call MD for:  temperature >100.4       Medications:  Reconciled Home Medications:   Discharge Medication List as of 11/27/2017  3:51 PM      START taking these medications    Details   traMADol (ULTRAM) 50 mg tablet Take 2 tablets (100 mg total) by mouth every 6 (six) hours as needed for Pain., Starting Mon 11/27/2017, Until Sat 12/2/2017, Print         CONTINUE these medications which have NOT CHANGED    Details   amLODIPine (NORVASC) 10 MG tablet Take 10 mg by mouth once daily., Historical Med       lisinopril (PRINIVIL,ZESTRIL) 20 MG tablet Take 20 mg by mouth once daily., Until Discontinued, Historical Med      metformin (FORTAMET) 500 mg 24 hr tablet Take 500 mg by mouth daily with breakfast., Until Discontinued, Historical Med      omeprazole (PRILOSEC) 20 MG capsule Take 20 mg by mouth once daily., Until Discontinued, Historical Med      potassium chloride SA (K-DUR,KLOR-CON) 20 MEQ tablet Take 1 tablet (20 mEq total) by mouth 2 (two) times daily., Starting 10/9/2016, Until Discontinued, Print             Time spent on the discharge of patient: 60 minutes  Patient was seen and examined on the date of discharge and determined to be suitable for discharge.         Jessie Gordillo MD  Department of Hospital Medicine  [facility]

## 2019-11-27 NOTE — ED NOTES
Received report from JORGE ALBERTO Jenkins. Pt is currently laying in bed with no c/o of pain. Pt in no acute distress. Fluids still infusing   DM

## 2022-12-12 NOTE — ASSESSMENT & PLAN NOTE
Admit assessment complete. Plan of care reviewed. Patient verbalized understanding.     IV fluids. Monitor.
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
